# Patient Record
Sex: MALE | URBAN - METROPOLITAN AREA
[De-identification: names, ages, dates, MRNs, and addresses within clinical notes are randomized per-mention and may not be internally consistent; named-entity substitution may affect disease eponyms.]

---

## 2017-03-03 ENCOUNTER — OFFICE VISIT (OUTPATIENT)
Dept: FAMILY MEDICINE | Facility: CLINIC | Age: 42
End: 2017-03-03
Payer: COMMERCIAL

## 2017-03-03 DIAGNOSIS — R30.0 DYSURIA: ICD-10-CM

## 2017-03-03 DIAGNOSIS — K59.04 CHRONIC IDIOPATHIC CONSTIPATION: Primary | ICD-10-CM

## 2017-03-03 PROCEDURE — 99203 OFFICE O/P NEW LOW 30 MIN: CPT | Performed by: FAMILY MEDICINE

## 2017-03-03 NOTE — MR AVS SNAPSHOT
"              After Visit Summary   3/3/2017    Franco Benavidez    MRN: [id number]           Patient Information     Date Of Birth          1975        Visit Information        Provider Department      3/3/2017 3:40 PM Laura Herman MD [facility]        Today's Diagnoses     Chronic idiopathic constipation    -  1    Dysuria           Follow-ups after your visit        Who to contact     If you have questions or need follow up information about today's clinic visit or your schedule please contact [facility] directly at [phone].  Normal or non-critical lab and imaging results will be communicated to you by MYOShart, letter or phone within 4 business days after the clinic has received the results. If you do not hear from us within 7 days, please contact the clinic through MYOShart or phone. If you have a critical or abnormal lab result, we will notify you by phone as soon as possible.  Submit refill requests through Blacklane or call your pharmacy and they will forward the refill request to us. Please allow 3 business days for your refill to be completed.          Additional Information About Your Visit        MyChart Information     Blacklane lets you send messages to your doctor, view your test results, renew your prescriptions, schedule appointments and more. To sign up, go to [email or web address] . Click on \"Log in\" on the left side of the screen, which will take you to the Welcome page. Then click on \"Sign up Now\" on the right side of the page.     You will be asked to enter the access code listed below, as well as some personal information. Please follow the directions to create your username and password.     Your access code is: [email or web address]  Expires: 2017  8:35 AM     Your access code will  in 90 days. If you need help or a new code, please call your [facility] or [phone].        Care EveryWhere ID     This is your Care EveryWhere ID. " "This could be used by other organizations to access your [facility] medical records  [id number]        Your Vitals Were     Pulse Temperature Height BMI (Body Mass Index)          72 97.3  F (36.3  C) (Oral) 5' 6\" (1.676 m) 35.02 kg/m2         Blood Pressure from Last 3 Encounters:   03/06/17 124/70    Weight from Last 3 Encounters:   03/06/17 217 lb (98.4 kg)              Today, you had the following     No orders found for display       Primary Care Provider    None Specified       No primary provider on file.        Thank you!     Thank you for choosing [facility]  for your care. Our goal is always to provide you with excellent care. Hearing back from our patients is one way we can continue to improve our services. Please take a few minutes to complete the written survey that you may receive in the mail after your visit with us. Thank you!             Your Updated Medication List - Protect others around you: Learn how to safely use, store and throw away your medicines at www.disposemymeds.org.      Notice  As of 3/3/2017 11:59 PM    You have not been prescribed any medications.      "

## 2017-03-06 VITALS
SYSTOLIC BLOOD PRESSURE: 124 MMHG | TEMPERATURE: 97.3 F | WEIGHT: 217 LBS | HEIGHT: 66 IN | BODY MASS INDEX: 34.87 KG/M2 | DIASTOLIC BLOOD PRESSURE: 70 MMHG | HEART RATE: 72 BPM

## 2017-03-06 NOTE — NURSING NOTE
"Chief Complaint   Patient presents with     Rectal Problem       Initial /70 (BP Location: Right arm, Patient Position: Chair, Cuff Size: Adult Large)  Pulse 72  Temp 97.3  F (36.3  C) (Oral)  Ht 5' 6\" (1.676 m)  Wt 217 lb (98.4 kg)  BMI 35.02 kg/m2 Estimated body mass index is 35.02 kg/(m^2) as calculated from the following:    Height as of this encounter: 5' 6\" (1.676 m).    Weight as of this encounter: 217 lb (98.4 kg).  Medication Reconciliation: complete    "

## 2017-03-06 NOTE — PROGRESS NOTES
"  SUBJECTIVE:                                                    Pramod Benavidez is a 41 year old male who presents to clinic today for the following health issues:    Hemorrhoids     Onset: Chronic- had hemorrhoid surgery back in [place] maybe 6 years ago    Description:   Pain: YES sometimes but could be due to constipation ?  Itching: no     Accompanying Signs & Symptoms:  Blood streaked toilet paper: no   Blood in stool: no   Changes in stool pattern: no    History:   Any previous GI studies done:none  Family History of colon cancer: no     Precipitating factors:    Thinks the surgery made it worst- feels his muscle is too tight- cannot pass BM.    Alleviating factors:  None     Therapies Tried and outcome: none      Patient had hemorrhoid surgery back in [place] roughly 7 years ago.  Following the procedure his BM improved but he was also eating softer foods. 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 Denies any change in appetite.     When he tries to go to the bathroom it is a \"big disaster\". BM are painful, he has to strain to have BM, but is generally non-productive. He has some form of BM on a daily basis, but feels he only clears his bowels roughly twice a week.   He has been living with pain on a daily basis for 6 years now.    About a month and a half ago he had an enema in the ER and felt relief. He was given a powder stool softener upon discharge which he used for 4-5 days but did nothing for him.     Had his appendix removed when he was 14-15 years old.   [facility] medicine is different in [place], he feels his surgeries were not properly done.      Problem list and histories reviewed & adjusted, as indicated.  Additional history: as documented    Patient Active Problem List   Diagnosis     Scrotal varices     No past surgical history on file.    Social History   Substance Use Topics     Smoking status: Never Smoker     Smokeless tobacco: Never Used     Alcohol use No     Family History " "  Problem Relation Age of Onset     DIABETES No family hx of      Coronary Artery Disease No family hx of      Hypertension No family hx of      Hyperlipidemia No family hx of      CEREBROVASCULAR DISEASE No family hx of      Breast Cancer No family hx of      Colon Cancer No family hx of      Prostate Cancer No family hx of          Current Outpatient Prescriptions   Medication Sig Dispense Refill     NO ACTIVE MEDICATIONS        No Known Allergies  Reviewed and updated as needed this visit by clinical staff  Reviewed and updated as needed this visit by Provider      ROS:  Constitutional, HEENT, cardiovascular, pulmonary, gi and gu systems are negative, except as otherwise noted.    This document serves as a record of the services and decisions personally performed and made by Laura Herman MD. It was created on his/her behalf by Shy Lopez, trained medical scribe. The creation of this document is based the provider's statements to the medical scribes.    Scribniecy Lopez 4:21 PM, March 3, 2017  OBJECTIVE:                                                    /70 (BP Location: Right arm, Patient Position: Chair, Cuff Size: Adult Large)  Pulse 72  Temp 97.3  F (36.3  C) (Oral)  Resp 20  Ht 1.676 m (5' 6\")  Wt 98.8 kg (217 lb 12.8 oz)  BMI 35.15 kg/m2  Body mass index is 35.15 kg/(m^2).  GENERAL: healthy, alert and no distress  ABDOMEN: soft, nontender, no hepatosplenomegaly, no masses and bowel sounds normal  RECTAL (male): normal sphincter tone, no rectal masses    Diagnostic Test Results:  none      ASSESSMENT/PLAN:                                                    (R30.0) Dysuria  Comment: Urine sample today was normal.  Plan: UA reflex to Microscopic and Culture    (K59.00) Constipation, unspecified constipation type  Comment: He has been having constipation for roughly 6 years now following hemorrhoid surgery in [place]. No hemorrhoids visualized on exam.  Plan: CBC with platelets, " Comprehensive metabolic         panel, TSH with free T4 reflex, COLORECTAL         SURGERY REFERRAL, polyethylene glycol (MIRALAX)        powder, glycerin, adult, (GLYCERIN, ADULT,) 2 G        SUPP Suppository        Recommend he use Miralax daily with Glycerin suppositories biweekly. Placed referral for him to schedule with GI  specialist in the next couple weeks for further evaluation.       Patient Instructions   I recommend drinking 40 ounces of water daily.   Avoid eating rice as much as you can.  Start Miralax- stool softener. Mix the powder in a large glass of water at bedtime every night.  Try Glycerin suppositories twice a week for more immediate relief. They can take 30-60 minutes to work.    I would like you to schedule with a Colorectal specialist for further evaluation.  Please call me if you cannot get an appointment within 2-3 weeks.                    Constipation  What is constipation?   Constipation means that bowel movements are infrequent and hard to pass and cause you to strain during bowel movements. It is not considered to be constipation if the bowel movement is not hard and difficult to pass.  What is the normal frequency of bowel movements for one person can be different for another person. For some people, 3 times a day is normal. For others once every 3 days may be normal. What's important is whether there is a change in what has been normal for you.   How does it occur?   You may have constipation because:  You ignore the urge and wait too long to have bowel movements.   You overuse some types of laxatives.   You do not drink enough fluids.   You do not eat enough fiber.   You don't have enough physical activity.   You are taking iron pills or a medicine that has a side effect of constipation.  Other possible causes are:  pregnancy   depression or stress   some medical conditions and diseases.  What are the symptoms?   Symptoms may include having:  small bowel movements   hard, dry bowel  movements   uncomfortable or painful bowel movements that are hard to pass   a longer time than usual between bowel movements   bloating and feeling like you have a full bowel.  Normal bowel movements vary from person to person. For some people, 3 times a day is normal. For others 3 times a week may be normal. What's important are changes in what has been normal for you.  How is it treated?   To ease your constipation:  Drink more fluids.   Add more fiber to your diet, such as bran muffins, jaydon crackers, oatmeal, brown rice, whole wheat bread, fresh fruits and vegetables, and popcorn.   Get more exercise.   Make sure that you go to the bathroom whenever you feel that you need to go. Don t wait.  Laxatives may be used for a short time, generally less than 1 week. Many people find fiber supplements, such as Metamucil, Citrucel, or other psyllium products, to be helpful, but sometimes they can make constipation worse.  Ask your healthcare provider if any medicines you are taking may be causing constipation.  Tell your healthcare provider if:  You start having constipation after years of normal bowel movements.   You have bouts of constipation alternating with bouts of diarrhea.   You have pain during bowel movements or for some time afterward.   Your bowel movements are dark or tar-colored or have blood in them.   You are losing weight without trying.  How can I take care of myself?   To help take care of yourself:  Eat fresh vegetables and fruit every day.   Exercise regularly. For example, if you are able, walk for at least 30 minutes every day. Check with your healthcare provider before adding any new exercise.   Drink prune juice or eat stewed fruits at breakfast.   Drink enough liquids each day to keep your urine light yellow in color.   Increase the whole-grain fiber in your diet by eating cereals with 5 or more grams of fiber per serving (for example, shredded wheat or bran flakes).   Ask your healthcare  provider about taking fiber products or laxatives or giving yourself an enema. You can take a fiber product like Metamucil or Citrucel once or twice a day for several days if you are constipated. Avoid overusing other laxatives, such as cathartics, which are products that will cause a liquid bowel movement. Cathartics, including Milk of magnesia or Epsom salt, irritate the lining of the intestines.   Call your provider if:   Constipation lasts longer than 1 week.   You have constipation alternating with diarrhea.   You have blood in your stool.   You have severe abdominal pain.   You have abdominal swelling or vomiting.   You have a fever higher than 101.5  F (38.6  C).   You have any symptoms that worry you.     Published by [employer].  This content is reviewed periodically and is subject to change as new health information becomes available. The information is intended to inform and educate and is not a replacement for medical evaluation, advice, diagnosis or treatment by a healthcare professional.  Developed by [employer] [place] RN, [place], and [facility] [facility].    2011 [facility] and/or its affiliates. All rights reserved.                        The information in this document, created by the medical scribe for me, accurately reflects the services I personally performed and the decisions made by me. I have reviewed and approved this document for accuracy prior to leaving the patient care area.  Laura Herman MD  4:21 PM, 03/03/17    Laura Herman MD  [facility]

## 2017-03-07 ENCOUNTER — TELEPHONE (OUTPATIENT)
Dept: FAMILY MEDICINE | Facility: CLINIC | Age: 42
End: 2017-03-07

## 2017-03-07 DIAGNOSIS — R79.89 ABNORMAL TSH: Primary | ICD-10-CM

## 2017-03-07 PROBLEM — R30.0 DYSURIA: Status: ACTIVE | Noted: 2017-03-07

## 2017-03-07 PROBLEM — R30.0 DYSURIA: Status: RESOLVED | Noted: 2017-03-07 | Resolved: 2017-03-07

## 2017-03-07 PROBLEM — K59.04 CHRONIC IDIOPATHIC CONSTIPATION: Status: ACTIVE | Noted: 2017-03-07

## 2017-03-07 NOTE — LETTER
[address] [id number] [phone]      September 8, 2017      Franco Benavidez  [address] [address]          Dear Franco Benavidez:    This is to remind you that your provider wanted you to return to the clinic for lab testing.    If you are coming in for Lipids and/or Glucose testing please fast for 10-12 hours. Morning medications can be taken with water.    You may call our office at [phone] to schedule an appointment.    Please disregard this notice if you have already had your labs drawn or made an            appointment.        Sincerely,    Anisha Prakash MD

## 2017-03-07 NOTE — TELEPHONE ENCOUNTER
Please call patient through .  Labs generally look good.  Blood counts, kidneys, liver, nonfasting blood sugar are normal.  Thyroid level is just a little bit off,  I do not think this is the cause of his constipation.  But I would recommend rechecking this lab in 3-6 months.    Has he been able to schedule an appointment with colorectal clinic?

## 2017-03-08 RX ORDER — POLYETHYLENE GLYCOL 3350 17 G/17G
1 POWDER, FOR SOLUTION ORAL DAILY
COMMUNITY